# Patient Record
Sex: FEMALE | Race: BLACK OR AFRICAN AMERICAN | NOT HISPANIC OR LATINO | URBAN - METROPOLITAN AREA
[De-identification: names, ages, dates, MRNs, and addresses within clinical notes are randomized per-mention and may not be internally consistent; named-entity substitution may affect disease eponyms.]

---

## 2018-10-02 ENCOUNTER — EMERGENCY (EMERGENCY)
Facility: HOSPITAL | Age: 48
LOS: 1 days | Discharge: HOME | End: 2018-10-02
Admitting: EMERGENCY MEDICINE
Payer: COMMERCIAL

## 2018-10-02 ENCOUNTER — RESULT REVIEW (OUTPATIENT)
Age: 48
End: 2018-10-02

## 2018-10-02 VITALS
TEMPERATURE: 98 F | SYSTOLIC BLOOD PRESSURE: 111 MMHG | HEART RATE: 64 BPM | DIASTOLIC BLOOD PRESSURE: 63 MMHG | OXYGEN SATURATION: 100 % | RESPIRATION RATE: 18 BRPM

## 2018-10-02 PROBLEM — Z00.00 ENCOUNTER FOR PREVENTIVE HEALTH EXAMINATION: Status: ACTIVE | Noted: 2018-10-02

## 2018-10-02 PROCEDURE — 99284 EMERGENCY DEPT VISIT MOD MDM: CPT

## 2018-10-02 NOTE — ED ADULT TRIAGE NOTE - CHIEF COMPLAINT QUOTE
pt. states she went to the OBGYN earlier today, the OB attempted to remove her IUD but only a part of it was successfully retrieved, pt. was advised that she will need sx to remove the remainder.  Reports abd cramping and vaginal bleeding upon returning home, used 1 sanitary pad since about 6pm.  PMHx lupus.

## 2018-10-02 NOTE — ED PROVIDER NOTE - PROGRESS NOTE DETAILS
HERMAN Dunham: Spoke with GYN, as long as IUD has not migrated out of the uterus she can schedule outpt follow up, will touch base with them with results of sono. HERMAN Dunham: Pt signed out to HERMAN León pending US and lab results, discussion with GYN in regards to US results. HERMAN RMAIREZ:  Sono shows Poorly visualized echogenic focus within the lower uterine segment,   likely representing the retained IUD.  Results discussed with OB/GYN who recommends discharge and outpatient management.

## 2018-10-02 NOTE — ED PROVIDER NOTE - PLAN OF CARE
Advance activity as tolerated.  Continue all previously prescribed medications as directed unless otherwise instructed.  Take Tylenol 650mg (Two 325 mg pills) every 4-6 hours as needed for pain or fevers. Follow up with your OB/GYN in 48-72 hours- bring copies of your results.  Return to the ER for worsening or persistent symptoms, including but not limited to worsening/persistent pain and/or ANY NEW OR CONCERNING SYMPTOMS. If you have issues obtaining follow up, please call: [phone] to obtain a doctor or specialist who takes your insurance in your area.  You may call [phone] to make an appointment with the internal medicine clinic.

## 2018-10-02 NOTE — ED PROVIDER NOTE - MEDICAL DECISION MAKING DETAILS
48yF w/pmhx lupus presenting with vaginal bleeding and pelvic cramping after having part of her IUD removed today. Will get TVUS to locate the remainder of the IUD, cbc/cmp, ucg, GYN consult.

## 2018-10-02 NOTE — ED PROVIDER NOTE - CARE PLAN
Principal Discharge DX:	IUD complication  Assessment and plan of treatment:	Advance activity as tolerated.  Continue all previously prescribed medications as directed unless otherwise instructed.  Take Tylenol 650mg (Two 325 mg pills) every 4-6 hours as needed for pain or fevers. Follow up with your OB/GYN in 48-72 hours- bring copies of your results.  Return to the ER for worsening or persistent symptoms, including but not limited to worsening/persistent pain and/or ANY NEW OR CONCERNING SYMPTOMS. If you have issues obtaining follow up, please call: [phone] to obtain a doctor or specialist who takes your insurance in your area.  You may call [phone] to make an appointment with the internal medicine clinic.

## 2018-10-02 NOTE — ED PROVIDER NOTE - OBJECTIVE STATEMENT
48yF w/pmhx lupus presenting with pelvic cramping and vaginal bleeding after having part of her IUD removed today. Pt states she had a 5 year IUD in place for 8 years and went to have it removed today. She was told that only part of the IUD was removed and that she would need surgery to remove to the rest. Pt states she was told to get a pelvic US but it was closed when she got there. Upon returning home she developed pelvic cramping and vaginal bleeding, on her second pad. Pt denies fever/chills, nausea, vomiting, diarrhea, lightheadedness, dizziness, chest pain, sob or any other concerns.  Pts GYN is affiliated Dr. Sandy Cheema

## 2018-10-02 NOTE — ED PROVIDER NOTE - PHYSICAL EXAMINATION
Pelvic: no active bleeding, unable to visualize IUD strings, no cervical tenderness or adnexal tenderness

## 2018-10-03 LAB
ALBUMIN SERPL ELPH-MCNC: 3.8 G/DL — SIGNIFICANT CHANGE UP (ref 3.3–5)
ALP SERPL-CCNC: 67 U/L — SIGNIFICANT CHANGE UP (ref 40–120)
ALT FLD-CCNC: 18 U/L — SIGNIFICANT CHANGE UP (ref 4–33)
AST SERPL-CCNC: 36 U/L — HIGH (ref 4–32)
BASOPHILS # BLD AUTO: 0.02 K/UL — SIGNIFICANT CHANGE UP (ref 0–0.2)
BASOPHILS NFR BLD AUTO: 0.4 % — SIGNIFICANT CHANGE UP (ref 0–2)
BILIRUB SERPL-MCNC: < 0.2 MG/DL — LOW (ref 0.2–1.2)
BUN SERPL-MCNC: 13 MG/DL — SIGNIFICANT CHANGE UP (ref 7–23)
CALCIUM SERPL-MCNC: 8.4 MG/DL — SIGNIFICANT CHANGE UP (ref 8.4–10.5)
CHLORIDE SERPL-SCNC: 103 MMOL/L — SIGNIFICANT CHANGE UP (ref 98–107)
CO2 SERPL-SCNC: 24 MMOL/L — SIGNIFICANT CHANGE UP (ref 22–31)
CREAT SERPL-MCNC: 0.79 MG/DL — SIGNIFICANT CHANGE UP (ref 0.5–1.3)
EOSINOPHIL # BLD AUTO: 0.26 K/UL — SIGNIFICANT CHANGE UP (ref 0–0.5)
EOSINOPHIL NFR BLD AUTO: 4.7 % — SIGNIFICANT CHANGE UP (ref 0–6)
GLUCOSE SERPL-MCNC: 83 MG/DL — SIGNIFICANT CHANGE UP (ref 70–99)
HCT VFR BLD CALC: 32.7 % — LOW (ref 34.5–45)
HGB BLD-MCNC: 10.3 G/DL — LOW (ref 11.5–15.5)
IMM GRANULOCYTES # BLD AUTO: 0.01 # — SIGNIFICANT CHANGE UP
IMM GRANULOCYTES NFR BLD AUTO: 0.2 % — SIGNIFICANT CHANGE UP (ref 0–1.5)
LYMPHOCYTES # BLD AUTO: 2.53 K/UL — SIGNIFICANT CHANGE UP (ref 1–3.3)
LYMPHOCYTES # BLD AUTO: 45.4 % — HIGH (ref 13–44)
MCHC RBC-ENTMCNC: 27.2 PG — SIGNIFICANT CHANGE UP (ref 27–34)
MCHC RBC-ENTMCNC: 31.5 % — LOW (ref 32–36)
MCV RBC AUTO: 86.3 FL — SIGNIFICANT CHANGE UP (ref 80–100)
MONOCYTES # BLD AUTO: 0.63 K/UL — SIGNIFICANT CHANGE UP (ref 0–0.9)
MONOCYTES NFR BLD AUTO: 11.3 % — SIGNIFICANT CHANGE UP (ref 2–14)
NEUTROPHILS # BLD AUTO: 2.12 K/UL — SIGNIFICANT CHANGE UP (ref 1.8–7.4)
NEUTROPHILS NFR BLD AUTO: 38 % — LOW (ref 43–77)
NRBC # FLD: 0 — SIGNIFICANT CHANGE UP
PLATELET # BLD AUTO: 225 K/UL — SIGNIFICANT CHANGE UP (ref 150–400)
PMV BLD: 12.4 FL — SIGNIFICANT CHANGE UP (ref 7–13)
POTASSIUM SERPL-MCNC: 4.7 MMOL/L — SIGNIFICANT CHANGE UP (ref 3.5–5.3)
POTASSIUM SERPL-SCNC: 4.7 MMOL/L — SIGNIFICANT CHANGE UP (ref 3.5–5.3)
PROT SERPL-MCNC: 7.5 G/DL — SIGNIFICANT CHANGE UP (ref 6–8.3)
RBC # BLD: 3.79 M/UL — LOW (ref 3.8–5.2)
RBC # FLD: 12.4 % — SIGNIFICANT CHANGE UP (ref 10.3–14.5)
SODIUM SERPL-SCNC: 136 MMOL/L — SIGNIFICANT CHANGE UP (ref 135–145)
WBC # BLD: 5.57 K/UL — SIGNIFICANT CHANGE UP (ref 3.8–10.5)
WBC # FLD AUTO: 5.57 K/UL — SIGNIFICANT CHANGE UP (ref 3.8–10.5)

## 2018-10-03 PROCEDURE — 76830 TRANSVAGINAL US NON-OB: CPT | Mod: 26

## 2018-10-05 PROBLEM — L93.0 DISCOID LUPUS ERYTHEMATOSUS: Chronic | Status: ACTIVE | Noted: 2018-10-02

## 2018-10-06 ENCOUNTER — OUTPATIENT (OUTPATIENT)
Dept: OUTPATIENT SERVICES | Facility: HOSPITAL | Age: 48
LOS: 1 days | End: 2018-10-06
Payer: COMMERCIAL

## 2018-10-06 VITALS
HEART RATE: 60 BPM | TEMPERATURE: 98 F | RESPIRATION RATE: 16 BRPM | DIASTOLIC BLOOD PRESSURE: 70 MMHG | HEIGHT: 62 IN | OXYGEN SATURATION: 96 % | WEIGHT: 177.91 LBS | SYSTOLIC BLOOD PRESSURE: 120 MMHG

## 2018-10-06 DIAGNOSIS — L93.0 DISCOID LUPUS ERYTHEMATOSUS: ICD-10-CM

## 2018-10-06 DIAGNOSIS — T83.9XXA UNSPECIFIED COMPLICATION OF GENITOURINARY PROSTHETIC DEVICE, IMPLANT AND GRAFT, INITIAL ENCOUNTER: ICD-10-CM

## 2018-10-06 DIAGNOSIS — T83.39XA OTHER MECHANICAL COMPLICATION OF INTRAUTERINE CONTRACEPTIVE DEVICE, INITIAL ENCOUNTER: ICD-10-CM

## 2018-10-06 DIAGNOSIS — N60.01 SOLITARY CYST OF RIGHT BREAST: Chronic | ICD-10-CM

## 2018-10-06 DIAGNOSIS — Z98.890 OTHER SPECIFIED POSTPROCEDURAL STATES: Chronic | ICD-10-CM

## 2018-10-06 LAB
BASOPHILS # BLD AUTO: 0.01 K/UL — SIGNIFICANT CHANGE UP (ref 0–0.2)
BASOPHILS NFR BLD AUTO: 0.2 % — SIGNIFICANT CHANGE UP (ref 0–2)
BUN SERPL-MCNC: 17 MG/DL — SIGNIFICANT CHANGE UP (ref 7–23)
CALCIUM SERPL-MCNC: 9.3 MG/DL — SIGNIFICANT CHANGE UP (ref 8.4–10.5)
CHLORIDE SERPL-SCNC: 103 MMOL/L — SIGNIFICANT CHANGE UP (ref 98–107)
CO2 SERPL-SCNC: 25 MMOL/L — SIGNIFICANT CHANGE UP (ref 22–31)
CREAT SERPL-MCNC: 0.87 MG/DL — SIGNIFICANT CHANGE UP (ref 0.5–1.3)
EOSINOPHIL # BLD AUTO: 0.12 K/UL — SIGNIFICANT CHANGE UP (ref 0–0.5)
EOSINOPHIL NFR BLD AUTO: 2.1 % — SIGNIFICANT CHANGE UP (ref 0–6)
GLUCOSE SERPL-MCNC: 73 MG/DL — SIGNIFICANT CHANGE UP (ref 70–99)
HCT VFR BLD CALC: 36.1 % — SIGNIFICANT CHANGE UP (ref 34.5–45)
HGB BLD-MCNC: 11.7 G/DL — SIGNIFICANT CHANGE UP (ref 11.5–15.5)
IMM GRANULOCYTES # BLD AUTO: 0.01 # — SIGNIFICANT CHANGE UP
IMM GRANULOCYTES NFR BLD AUTO: 0.2 % — SIGNIFICANT CHANGE UP (ref 0–1.5)
LYMPHOCYTES # BLD AUTO: 1.89 K/UL — SIGNIFICANT CHANGE UP (ref 1–3.3)
LYMPHOCYTES # BLD AUTO: 32.5 % — SIGNIFICANT CHANGE UP (ref 13–44)
MCHC RBC-ENTMCNC: 27.7 PG — SIGNIFICANT CHANGE UP (ref 27–34)
MCHC RBC-ENTMCNC: 32.4 % — SIGNIFICANT CHANGE UP (ref 32–36)
MCV RBC AUTO: 85.5 FL — SIGNIFICANT CHANGE UP (ref 80–100)
MONOCYTES # BLD AUTO: 0.44 K/UL — SIGNIFICANT CHANGE UP (ref 0–0.9)
MONOCYTES NFR BLD AUTO: 7.6 % — SIGNIFICANT CHANGE UP (ref 2–14)
NEUTROPHILS # BLD AUTO: 3.34 K/UL — SIGNIFICANT CHANGE UP (ref 1.8–7.4)
NEUTROPHILS NFR BLD AUTO: 57.4 % — SIGNIFICANT CHANGE UP (ref 43–77)
NRBC # FLD: 0 — SIGNIFICANT CHANGE UP
PLATELET # BLD AUTO: 259 K/UL — SIGNIFICANT CHANGE UP (ref 150–400)
PMV BLD: 11.7 FL — SIGNIFICANT CHANGE UP (ref 7–13)
POTASSIUM SERPL-MCNC: 4.4 MMOL/L — SIGNIFICANT CHANGE UP (ref 3.5–5.3)
POTASSIUM SERPL-SCNC: 4.4 MMOL/L — SIGNIFICANT CHANGE UP (ref 3.5–5.3)
RBC # BLD: 4.22 M/UL — SIGNIFICANT CHANGE UP (ref 3.8–5.2)
RBC # FLD: 12.7 % — SIGNIFICANT CHANGE UP (ref 10.3–14.5)
SODIUM SERPL-SCNC: 140 MMOL/L — SIGNIFICANT CHANGE UP (ref 135–145)
WBC # BLD: 5.81 K/UL — SIGNIFICANT CHANGE UP (ref 3.8–10.5)
WBC # FLD AUTO: 5.81 K/UL — SIGNIFICANT CHANGE UP (ref 3.8–10.5)

## 2018-10-06 PROCEDURE — 93010 ELECTROCARDIOGRAM REPORT: CPT

## 2018-10-06 NOTE — H&P PST ADULT - NEGATIVE NEUROLOGICAL SYMPTOMS
no transient paralysis/no paresthesias/no tremors/no difficulty walking/no vertigo/no loss of sensation/no loss of consciousness/no headache/no weakness

## 2018-10-06 NOTE — H&P PST ADULT - FAMILY HISTORY
Mother  Still living? Yes, Estimated age: Age Unknown  Family history of diabetes mellitus (DM), Age at diagnosis: 71-80  Family history of breast cancer in mother, Age at diagnosis: Age Unknown

## 2018-10-06 NOTE — H&P PST ADULT - PSH
Breast cyst, right  Excision of cyst - benign - 2008   Deliv (ICD9 669.71)  x 2 ( & )  H/O cardiac radiofrequency ablation  2007

## 2018-10-06 NOTE — H&P PST ADULT - PRO PAIN LIFE ADAPT
inability to concentrate/decreased activity level/inability to enjoy life/inability or reluctance to perform ADLs/inability to work

## 2018-10-06 NOTE — H&P PST ADULT - PROBLEM SELECTOR PLAN 1
Scheduled for Intrauterine Device Removal, Operative Hysteroscopy on 10/18/18  Pending labs; Preop instructions with Famotidine given & explained, pt verbalized understanding

## 2018-10-06 NOTE — H&P PST ADULT - NEGATIVE CARDIOVASCULAR SYMPTOMS
no dyspnea on exertion/no chest pain/no claudication/no peripheral edema/no palpitations/no orthopnea/no paroxysmal nocturnal dyspnea

## 2018-10-06 NOTE — H&P PST ADULT - HISTORY OF PRESENT ILLNESS
47 y/o female with Hx Lupus, RA, presents for preop evaluation for Intrauterine Device Removal, Operative Hysteroscopy on 10/18/18. Pt had IUD inserted since 2010 & recently started c/o of vaginal spotting. Pt was referred to Dr Larson for further evaluation; where She attempted to remove IUD and The device broke. Since then pt has been c/o of bleed 47 y/o female with Hx Lupus, RA, presents for preop evaluation for Intrauterine Device Removal, Operative Hysteroscopy on 10/18/18. Pt had IUD inserted since 2010 & recently started c/o of vaginal spotting. Pt was referred to Dr Larson for further evaluation; where She attempted to remove IUD and the device broke. Since then pt has been c/o of bleeding & abdominal /pelvic cramping. 49 y/o female with Hx SVT's (2007), s/p Ablation, Lupus, RA, presents for preop evaluation for Intrauterine Device Removal, Operative Hysteroscopy on 10/18/18. Pt had IUD inserted since 2010 & recently started c/o of vaginal spotting. Pt was referred to Dr Larson for further evaluation; where She attempted to remove IUD and the device broke. Since then pt has been c/o of bleeding & abdominal /pelvic cramping.

## 2018-10-06 NOTE — H&P PST ADULT - NEGATIVE ENMT SYMPTOMS
no ear pain/no nasal congestion/no nasal obstruction/no hearing difficulty/no post-nasal discharge/no nose bleeds/no tinnitus/no vertigo/no sinus symptoms

## 2018-10-06 NOTE — H&P PST ADULT - RS GEN PE MLT RESP DETAILS PC
respirations non-labored/clear to auscultation bilaterally/breath sounds equal/no wheezes/no rales/no rhonchi/airway patent

## 2018-10-06 NOTE — H&P PST ADULT - NEGATIVE GENERAL GENITOURINARY SYMPTOMS
no urinary hesitancy/no dysuria/normal urinary frequency/no incontinence/no hematuria/no flank pain L/no flank pain R

## 2018-10-09 PROBLEM — M06.9 RHEUMATOID ARTHRITIS, UNSPECIFIED: Chronic | Status: ACTIVE | Noted: 2018-10-06

## 2018-10-09 PROBLEM — I47.1 SUPRAVENTRICULAR TACHYCARDIA: Chronic | Status: ACTIVE | Noted: 2018-10-06

## 2018-10-11 ENCOUNTER — TRANSCRIPTION ENCOUNTER (OUTPATIENT)
Age: 48
End: 2018-10-11

## 2018-10-12 ENCOUNTER — RESULT REVIEW (OUTPATIENT)
Age: 48
End: 2018-10-12

## 2018-10-12 ENCOUNTER — OUTPATIENT (OUTPATIENT)
Dept: OUTPATIENT SERVICES | Facility: HOSPITAL | Age: 48
LOS: 1 days | End: 2018-10-12
Payer: COMMERCIAL

## 2018-10-12 VITALS
HEIGHT: 62 IN | HEART RATE: 89 BPM | OXYGEN SATURATION: 100 % | RESPIRATION RATE: 18 BRPM | DIASTOLIC BLOOD PRESSURE: 36 MMHG | WEIGHT: 177.91 LBS | SYSTOLIC BLOOD PRESSURE: 94 MMHG | TEMPERATURE: 98 F

## 2018-10-12 VITALS
OXYGEN SATURATION: 99 % | HEART RATE: 69 BPM | TEMPERATURE: 98 F | RESPIRATION RATE: 18 BRPM | DIASTOLIC BLOOD PRESSURE: 68 MMHG | SYSTOLIC BLOOD PRESSURE: 115 MMHG

## 2018-10-12 DIAGNOSIS — Z98.890 OTHER SPECIFIED POSTPROCEDURAL STATES: Chronic | ICD-10-CM

## 2018-10-12 DIAGNOSIS — N60.01 SOLITARY CYST OF RIGHT BREAST: Chronic | ICD-10-CM

## 2018-10-12 DIAGNOSIS — T83.31XA BREAKDOWN (MECHANICAL) OF INTRAUTERINE CONTRACEPTIVE DEVICE, INITIAL ENCOUNTER: ICD-10-CM

## 2018-10-12 DIAGNOSIS — Z53.8 PROCEDURE AND TREATMENT NOT CARRIED OUT FOR OTHER REASONS: ICD-10-CM

## 2018-10-12 LAB — HCG UR QL: NEGATIVE — SIGNIFICANT CHANGE UP

## 2018-10-12 PROCEDURE — 88300 SURGICAL PATH GROSS: CPT

## 2018-10-12 PROCEDURE — 88300 SURGICAL PATH GROSS: CPT | Mod: 26

## 2018-10-12 PROCEDURE — 58562 HYSTEROSCOPY REMOVE FB: CPT

## 2018-10-12 PROCEDURE — 81025 URINE PREGNANCY TEST: CPT

## 2018-10-12 RX ORDER — IBUPROFEN 200 MG
600 TABLET ORAL EVERY 6 HOURS
Qty: 0 | Refills: 0 | Status: DISCONTINUED | OUTPATIENT
Start: 2018-10-12 | End: 2018-10-20

## 2018-10-12 RX ORDER — HYDROMORPHONE HYDROCHLORIDE 2 MG/ML
0.5 INJECTION INTRAMUSCULAR; INTRAVENOUS; SUBCUTANEOUS
Qty: 0 | Refills: 0 | Status: DISCONTINUED | OUTPATIENT
Start: 2018-10-12 | End: 2018-10-15

## 2018-10-12 RX ORDER — HYDROXYCHLOROQUINE SULFATE 200 MG
1 TABLET ORAL
Qty: 0 | Refills: 0 | COMMUNITY

## 2018-10-12 RX ORDER — IBUPROFEN 200 MG
1 TABLET ORAL
Qty: 40 | Refills: 2 | OUTPATIENT
Start: 2018-10-12 | End: 2018-11-10

## 2018-10-12 RX ORDER — SODIUM CHLORIDE 9 MG/ML
1000 INJECTION, SOLUTION INTRAVENOUS
Qty: 0 | Refills: 0 | Status: DISCONTINUED | OUTPATIENT
Start: 2018-10-12 | End: 2018-10-15

## 2018-10-12 RX ORDER — ONDANSETRON 8 MG/1
4 TABLET, FILM COATED ORAL ONCE
Qty: 0 | Refills: 0 | Status: DISCONTINUED | OUTPATIENT
Start: 2018-10-12 | End: 2018-10-20

## 2018-10-12 RX ORDER — OMEGA-3 ACID ETHYL ESTERS 1 G
1 CAPSULE ORAL
Qty: 0 | Refills: 0 | COMMUNITY

## 2018-10-12 RX ADMIN — Medication 110 MILLIGRAM(S): at 19:15

## 2018-10-12 NOTE — ASU DISCHARGE PLAN (ADULT/PEDIATRIC). - LAUNCH MEDICATION RECONCILIATION
Face to face report given with opportunity to observe patient.    Report given to Jailene Dotson   9/7/2017  4:36 PM       <<-----Click here for Discharge Medication Review

## 2018-10-12 NOTE — ASU DISCHARGE PLAN (ADULT/PEDIATRIC). - ACTIVITY LEVEL
nothing per vagina/no douching/no tampons/no heavy lifting/nothing per rectum/no tub baths/no intercourse/weight bearing as tolerated

## 2018-10-12 NOTE — ASU DISCHARGE PLAN (ADULT/PEDIATRIC). - NOTIFY
Pain not relieved by Medications/Increased Irritability or Sluggishness/Inability to Tolerate Liquids or Foods/GYN Fever>100.4/Bleeding that does not stop/Unable to Urinate

## 2018-10-13 NOTE — BRIEF OPERATIVE NOTE - POST-OP DX
Abnormal uterine bleeding (AUB)  10/13/2018    Sandy Garcia  Submucous leiomyoma of uterus  10/13/2018    Sandy Garcia Mechanical breakdown of intrauterine contraceptive device, subsequent encounter  10/13/2018    Active  Sandy Larson

## 2018-10-13 NOTE — BRIEF OPERATIVE NOTE - OPERATION/FINDINGS
approximately 2x2 cm submucosal leiomyoma on the right lateral wall of the uterus part of the horizontal bar of the Paragard IUD with copper wire noted at the junction of the uterus and cervix, and removed successfully, Normal uterine cavity.

## 2018-10-13 NOTE — BRIEF OPERATIVE NOTE - SPECIMENS
Leiomyoma and endometrial curettings IUD part (the rest of the IUD was removed in the office prior to surgery)

## 2018-10-13 NOTE — BRIEF OPERATIVE NOTE - PROCEDURE
<<-----Click on this checkbox to enter Procedure Dilatation and curettage  10/13/2018    Active  LRAYEV  Hysteroscopic removal of leiomyomata  10/13/2018    Active  LRAYEV Diagnostic hysteroscopy for lost intrauterine device (IUD)  10/13/2018    Active  LRAYEV

## 2018-10-13 NOTE — BRIEF OPERATIVE NOTE - PRE-OP DX
Abnormal uterine bleeding (AUB)  10/13/2018    Sandy Garcia  Uterine polyp  10/13/2018    Sandy Garcia Mechanical breakdown of intrauterine contraceptive device, subsequent encounter  10/13/2018    Active  Sandy Larson

## 2018-11-12 NOTE — ED ADULT TRIAGE NOTE - AS O2 DELIVERY
Urine sample collected and sent to lab. Gave pt percocet order and pain 10/10 and she's moaning. CaLLED ME BACK TO ROOM AND WHEN I SPOKE TO HER THERE WAS NO RESPONSE. I SPOKE LOUDER AND SHE STARTED MOANING AND REQUESTED SOMETHING ELSE FOR HER ARM. tHEN [address] IT WS TO LATE TO DO ANOTHER ONE.  ALSO, EXPLAINED THE URINE NEEDS TO RESULT AND WHEN IT DOES i'LL CALL md IF PREGNANCE IS NEGATIVE.   Fatemeh Concepcion RN 
 
 room air

## 2019-05-05 ENCOUNTER — EMERGENCY (EMERGENCY)
Facility: HOSPITAL | Age: 49
LOS: 1 days | Discharge: HOME | End: 2019-05-05
Attending: EMERGENCY MEDICINE | Admitting: EMERGENCY MEDICINE
Payer: MEDICAID

## 2019-05-05 VITALS
HEART RATE: 77 BPM | TEMPERATURE: 99 F | RESPIRATION RATE: 20 BRPM | DIASTOLIC BLOOD PRESSURE: 66 MMHG | SYSTOLIC BLOOD PRESSURE: 100 MMHG

## 2019-05-05 DIAGNOSIS — Z98.890 OTHER SPECIFIED POSTPROCEDURAL STATES: Chronic | ICD-10-CM

## 2019-05-05 DIAGNOSIS — N60.01 SOLITARY CYST OF RIGHT BREAST: Chronic | ICD-10-CM

## 2019-05-05 LAB
ALBUMIN SERPL ELPH-MCNC: 4.2 G/DL — SIGNIFICANT CHANGE UP (ref 3.3–5)
ALP SERPL-CCNC: 69 U/L — SIGNIFICANT CHANGE UP (ref 40–120)
ALT FLD-CCNC: 22 U/L — SIGNIFICANT CHANGE UP (ref 4–33)
ANION GAP SERPL CALC-SCNC: 12 MMO/L — SIGNIFICANT CHANGE UP (ref 7–14)
AST SERPL-CCNC: 27 U/L — SIGNIFICANT CHANGE UP (ref 4–32)
BASOPHILS # BLD AUTO: 0.01 K/UL — SIGNIFICANT CHANGE UP (ref 0–0.2)
BASOPHILS NFR BLD AUTO: 0.2 % — SIGNIFICANT CHANGE UP (ref 0–2)
BILIRUB SERPL-MCNC: 0.3 MG/DL — SIGNIFICANT CHANGE UP (ref 0.2–1.2)
BUN SERPL-MCNC: 13 MG/DL — SIGNIFICANT CHANGE UP (ref 7–23)
CALCIUM SERPL-MCNC: 9 MG/DL — SIGNIFICANT CHANGE UP (ref 8.4–10.5)
CHLORIDE SERPL-SCNC: 111 MMOL/L — HIGH (ref 98–107)
CO2 SERPL-SCNC: 22 MMOL/L — SIGNIFICANT CHANGE UP (ref 22–31)
CREAT SERPL-MCNC: 0.81 MG/DL — SIGNIFICANT CHANGE UP (ref 0.5–1.3)
EOSINOPHIL # BLD AUTO: 0.03 K/UL — SIGNIFICANT CHANGE UP (ref 0–0.5)
EOSINOPHIL NFR BLD AUTO: 0.5 % — SIGNIFICANT CHANGE UP (ref 0–6)
GLUCOSE SERPL-MCNC: 92 MG/DL — SIGNIFICANT CHANGE UP (ref 70–99)
HCT VFR BLD CALC: 36.2 % — SIGNIFICANT CHANGE UP (ref 34.5–45)
HGB BLD-MCNC: 11.4 G/DL — LOW (ref 11.5–15.5)
IMM GRANULOCYTES NFR BLD AUTO: 0.3 % — SIGNIFICANT CHANGE UP (ref 0–1.5)
LYMPHOCYTES # BLD AUTO: 0.98 K/UL — LOW (ref 1–3.3)
LYMPHOCYTES # BLD AUTO: 17 % — SIGNIFICANT CHANGE UP (ref 13–44)
MCHC RBC-ENTMCNC: 26.9 PG — LOW (ref 27–34)
MCHC RBC-ENTMCNC: 31.5 % — LOW (ref 32–36)
MCV RBC AUTO: 85.4 FL — SIGNIFICANT CHANGE UP (ref 80–100)
MONOCYTES # BLD AUTO: 0.29 K/UL — SIGNIFICANT CHANGE UP (ref 0–0.9)
MONOCYTES NFR BLD AUTO: 5 % — SIGNIFICANT CHANGE UP (ref 2–14)
NEUTROPHILS # BLD AUTO: 4.43 K/UL — SIGNIFICANT CHANGE UP (ref 1.8–7.4)
NEUTROPHILS NFR BLD AUTO: 77 % — SIGNIFICANT CHANGE UP (ref 43–77)
NRBC # FLD: 0 K/UL — SIGNIFICANT CHANGE UP (ref 0–0)
PLATELET # BLD AUTO: 288 K/UL — SIGNIFICANT CHANGE UP (ref 150–400)
PMV BLD: 10.7 FL — SIGNIFICANT CHANGE UP (ref 7–13)
POTASSIUM SERPL-MCNC: 3.6 MMOL/L — SIGNIFICANT CHANGE UP (ref 3.5–5.3)
POTASSIUM SERPL-SCNC: 3.6 MMOL/L — SIGNIFICANT CHANGE UP (ref 3.5–5.3)
PROT SERPL-MCNC: 7.7 G/DL — SIGNIFICANT CHANGE UP (ref 6–8.3)
RBC # BLD: 4.24 M/UL — SIGNIFICANT CHANGE UP (ref 3.8–5.2)
RBC # FLD: 12.2 % — SIGNIFICANT CHANGE UP (ref 10.3–14.5)
SODIUM SERPL-SCNC: 145 MMOL/L — SIGNIFICANT CHANGE UP (ref 135–145)
WBC # BLD: 5.76 K/UL — SIGNIFICANT CHANGE UP (ref 3.8–10.5)
WBC # FLD AUTO: 5.76 K/UL — SIGNIFICANT CHANGE UP (ref 3.8–10.5)

## 2019-05-05 PROCEDURE — 99284 EMERGENCY DEPT VISIT MOD MDM: CPT

## 2019-05-05 RX ORDER — DIPHENHYDRAMINE HCL 50 MG
50 CAPSULE ORAL ONCE
Qty: 0 | Refills: 0 | Status: COMPLETED | OUTPATIENT
Start: 2019-05-05 | End: 2019-05-05

## 2019-05-05 RX ORDER — FAMOTIDINE 10 MG/ML
20 INJECTION INTRAVENOUS ONCE
Qty: 0 | Refills: 0 | Status: DISCONTINUED | OUTPATIENT
Start: 2019-05-05 | End: 2019-05-05

## 2019-05-05 RX ORDER — FAMOTIDINE 10 MG/ML
20 INJECTION INTRAVENOUS ONCE
Qty: 0 | Refills: 0 | Status: COMPLETED | OUTPATIENT
Start: 2019-05-05 | End: 2019-05-05

## 2019-05-05 RX ADMIN — FAMOTIDINE 20 MILLIGRAM(S): 10 INJECTION INTRAVENOUS at 23:18

## 2019-05-05 RX ADMIN — Medication 50 MILLIGRAM(S): at 23:18

## 2019-05-05 RX ADMIN — Medication 125 MILLIGRAM(S): at 23:18

## 2019-05-05 NOTE — ED ADULT NURSE NOTE - OBJECTIVE STATEMENT
48 y female A+OX3 presents to the ER with the complaint of facial swelling. Pt has hx of lupus and states today around 4:30pm she noticed swelling of the bilateral lower face along with joint pain and throat pain. Pt states it feels like her usual lupus flare up but the swelling of the face as per patient has not been to this degree. Pt states her tongue feels heavy too. Denies any allergies to foods or meds. Denies eating or drinking anything out of the usual today. Pt able to speak in full sentences but states she does have to spit  more often than usual since swelling. Pt sating 98% on room air. 20 g iv placed on left ac, labs drawn and sent. Will continue to monitor.

## 2019-05-05 NOTE — ED ADULT TRIAGE NOTE - CHIEF COMPLAINT QUOTE
Pt is having vomiting, facial swelling and throat tightness since three hours ago. Pt says she vomited several times. Has PMH of Lupus. c/o pain in throat and slight difficulty breathing. Both sides of face appears to be swollen. Pt says she feels like her tongue is swelling up also.

## 2019-05-06 VITALS
HEART RATE: 69 BPM | SYSTOLIC BLOOD PRESSURE: 99 MMHG | OXYGEN SATURATION: 100 % | TEMPERATURE: 99 F | DIASTOLIC BLOOD PRESSURE: 51 MMHG | RESPIRATION RATE: 18 BRPM

## 2019-05-06 LAB
CRP SERPL-MCNC: < 4 MG/L — SIGNIFICANT CHANGE UP
ERYTHROCYTE [SEDIMENTATION RATE] IN BLOOD: 7 MM/HR — SIGNIFICANT CHANGE UP (ref 4–25)

## 2019-05-06 RX ORDER — EPINEPHRINE 0.3 MG/.3ML
0.3 INJECTION INTRAMUSCULAR; SUBCUTANEOUS
Qty: 1 | Refills: 0 | OUTPATIENT
Start: 2019-05-06

## 2019-05-06 RX ORDER — DIPHENHYDRAMINE HCL 50 MG
1 CAPSULE ORAL
Qty: 15 | Refills: 0 | OUTPATIENT
Start: 2019-05-06 | End: 2019-05-10

## 2019-05-06 RX ORDER — FAMOTIDINE 10 MG/ML
1 INJECTION INTRAVENOUS
Qty: 8 | Refills: 0 | OUTPATIENT
Start: 2019-05-06 | End: 2019-05-09

## 2019-05-06 NOTE — ED PROVIDER NOTE - OBJECTIVE STATEMENT
48F h/o SLE on plaquenil, prior angioedema, c/o joint aches reminiscent of but worse than prior flare ups of SLE for few days, She too 2 old prednisone pills for last 2 days (does not know strength), also c/o nausea and vomiting followed by throat pain and tongue discomfort and swelling of lower face. No SOB or throat closing. No rash. no wheezing No fever. Review of Symptoms in all systems otherwise negative, except as indicated

## 2019-05-06 NOTE — ED PROVIDER NOTE - NSFOLLOWUPINSTRUCTIONS_ED_ALL_ED_FT
Please follow up with your rheumatologist and an allergist. Return to ED if any shortness of breath, unable to swallow saliva, increased swelling or other concerning symptoms. PLease take benadryl as needed for allergic symptoms. Please take prednisone daily. Please fill your prescription for an epipen.

## 2019-05-06 NOTE — ED PROVIDER NOTE - PROGRESS NOTE DETAILS
Torey Mujica MD PGY4: edema improved, will rx with allergic cocktail and return precautions, less likely lupus flare in setting of nml ESR/CRP

## 2019-05-06 NOTE — ED PROVIDER NOTE - PHYSICAL EXAMINATION
+ mild swelling of lower face bilat cheeks extending to upper neck and lips  Tongue normal appearance,   Voice normal No stridor  No wheezing  No skin rashes

## 2019-05-06 NOTE — ED PROVIDER NOTE - MUSCULOSKELETAL, MLM
Spine appears normal, range of motion is not limited, diffuse mild joint tenderness without swelling or redness

## 2019-05-06 NOTE — ED PROVIDER NOTE - CLINICAL SUMMARY MEDICAL DECISION MAKING FREE TEXT BOX
Lupus flare with possible allergic reaction/angioedema. Also N/V mild abdo pains. Plan: IV steroids, benadryl, pepcid, zofran, fluids, labs, serial exams

## 2019-10-01 ENCOUNTER — TRANSCRIPTION ENCOUNTER (OUTPATIENT)
Age: 49
End: 2019-10-01

## 2020-11-23 NOTE — ED ADULT TRIAGE NOTE - TEMPERATURE IN FAHRENHEIT (DEGREES F)
97.9 Rhomboid Transposition Flap Text: The defect edges were debeveled with a #15 scalpel blade.  Given the location of the defect and the proximity to free margins a rhomboid transposition flap was deemed most appropriate.  Using a sterile surgical marker, an appropriate rhomboid flap was drawn incorporating the defect.    The area thus outlined was incised deep to adipose tissue with a #15 scalpel blade.  The skin margins were undermined to an appropriate distance in all directions utilizing iris scissors.

## 2022-04-19 NOTE — H&P PST ADULT - CARDIOVASCULAR DETAILS
Kenalog Preparation: Kenalog Include Z78.9 (Other Specified Conditions Influencing Health Status) As An Associated Diagnosis?: No Medical Necessity Clause: This procedure was medically necessary because the lesions that were treated were: Detail Level: Detailed Treatment Number (Optional): 1 Validate Note Data When Using Inventory: Yes X Size Of Lesion In Cm (Optional): 0 Administered By (Optional): Madyson Mishra PA-C Total Volume Injected (Ccs- Only Use Numbers And Decimals): 2.0 Concentration Of Solution Injected (Mg/Ml): 10.0 Consent: The risks of atrophy were reviewed with the patient. positive S2/positive S1

## 2022-11-30 NOTE — H&P PST ADULT - PMH
I have sent in a PA for Lyrica per her insurance company because the one they received did not have the ICD code. I will contact the patient when I get an answer.    I have received a denial for her Lyrica, I have called and left her a message letting her know and to try a discount card to see if this would help pay for the cost.   Lupus    RA (rheumatoid arthritis)    SVT (supraventricular tachycardia)  2007

## 2023-05-02 ENCOUNTER — APPOINTMENT (OUTPATIENT)
Dept: PLASTIC SURGERY | Facility: CLINIC | Age: 53
End: 2023-05-02
Payer: COMMERCIAL

## 2023-05-02 VITALS — HEIGHT: 63 IN | WEIGHT: 176 LBS | TEMPERATURE: 98.8 F | BODY MASS INDEX: 31.18 KG/M2

## 2023-05-02 PROCEDURE — 99203 OFFICE O/P NEW LOW 30 MIN: CPT

## 2023-05-17 ENCOUNTER — APPOINTMENT (OUTPATIENT)
Dept: PLASTIC SURGERY | Facility: CLINIC | Age: 53
End: 2023-05-17
Payer: COMMERCIAL

## 2023-05-17 PROCEDURE — 99211 OFF/OP EST MAY X REQ PHY/QHP: CPT | Mod: NC

## 2023-05-17 NOTE — HISTORY OF PRESENT ILLNESS
[FreeTextEntry1] : 52-year-old female with torn earlobe on the left side.  Patient reports that earring got caught and split entire ear approximately 2 weeks ago.  There was minimal bleeding at the time.  There is no infection.\par No right ear also has a partially torn  lobe that has been stretching out over several years.  There is a history of keloid scarring\par Past medical history is significant for lupus\par Denies previous surgeries

## 2023-05-23 ENCOUNTER — APPOINTMENT (OUTPATIENT)
Dept: PLASTIC SURGERY | Facility: CLINIC | Age: 53
End: 2023-05-23
Payer: COMMERCIAL

## 2023-05-23 PROCEDURE — 14060 TIS TRNFR E/N/E/L 10 SQ CM/<: CPT

## 2023-05-24 NOTE — PROCEDURE
[FreeTextEntry6] : preop dx: left ear deformity\par postop : same\par procedure: left ear reconstruction with local flap, 1cm x 8mm\par no specimens\par no abx\par \par IC obtained.  lido w/ epi injected.  15 blade used to de-epithelialize wound edges.  chondrocutaneous lateral portion of pinna advanced into defect [id number].  deep tissues closed with 5-0 monocryl.  skin reapproximated with 6-0 prolene horizontal mattresses.  baci placed.\par

## 2023-06-01 ENCOUNTER — APPOINTMENT (OUTPATIENT)
Dept: PLASTIC SURGERY | Facility: CLINIC | Age: 53
End: 2023-06-01
Payer: COMMERCIAL

## 2023-06-01 DIAGNOSIS — H61.119 ACQUIRED DEFORMITY OF PINNA, UNSPECIFIED EAR: ICD-10-CM

## 2023-06-01 PROCEDURE — 99024 POSTOP FOLLOW-UP VISIT: CPT

## 2023-06-01 NOTE — HISTORY OF PRESENT ILLNESS
[FreeTextEntry1] : Dop; 5/23/23 \par S/P:  left earlobe repair, completely torn. \par  No excessive bleeding. No fevers. No odor. No purulent discharge. No excessive pain.\par h/o keloid
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
## 2023-10-02 NOTE — ASU PATIENT PROFILE, ADULT - PATIENT'S HEIGHT AND WEIGHT RECORDED IN THE VITAL SIGNS FLOWSHEET
- Encouraged healthy lifestyle, including adequate exercise and high fiber, low fat and low carb diet.   - recheck around Oct. 2023   yes

## 2024-06-12 NOTE — ED ADULT NURSE NOTE - CHIEF COMPLAINT
Patients Daughter calling on behalf of patient regarding message sent about patients finger. PCP asked if patient has ever tried prednisone before, daughter states she has not and would be willing to try it, can send script to [facility] in [place]. Daughter also requesting clarification regarding the Clochincine. States she thought PCP told her to only take it for a week, but script is written to be taken daily with a 30 days supply, so patient has been taking it daily for a while now. Nothing in OV notes from 5/3/24 to clarify this. Please call daughter back and leave detailed message if needed, does not check patients Mychart frequently.   
The patient is a 48y Female complaining of facial swelling.

## 2024-06-20 NOTE — PACU DISCHARGE NOTE - THE ANESTHESIA ORDERS USED IN THE PACU ORDER SET WILL BE DISCONTINUED UPON TRANSFER OF THIS PATIENT
Problem: Physical Therapy - Adult  Goal: By Discharge: Performs mobility at highest level of function for planned discharge setting.  See evaluation for individualized goals.  Description: FUNCTIONAL STATUS PRIOR TO ADMISSION: Mod I with use of rolling walker. Denies history of falls. Still driving.     HOME SUPPORT PRIOR TO ADMISSION: The patient lived alone however states daughter and grandson will be assisting at discharge.    Physical Therapy Goals  Initiated 6/20/2024  1.  Patient will move from supine to sit and sit to supine, scoot up and down, and roll side to side in bed with modified independence within 4 day(s).    2.  Patient will perform sit to stand with modified independence within 4 day(s).  3.  Patient will transfer from bed to chair and chair to bed with modified independence using the least restrictive device within 4 day(s).  4.  Patient will ambulate with modified independence for 300 feet with the least restrictive device within 4 day(s).   5.  Patient will ascend/descend 4 stairs with 1 handrail(s) with modified independence within 4 day(s).   6/20/2024 1507 by Gretchen Neely, PT  Outcome: Progressing     
  Problem: Physical Therapy - Adult  Goal: By Discharge: Performs mobility at highest level of function for planned discharge setting.  See evaluation for individualized goals.  Description: FUNCTIONAL STATUS PRIOR TO ADMISSION: Mod I with use of rolling walker. Denies history of falls. Still driving.     HOME SUPPORT PRIOR TO ADMISSION: The patient lived alone however states daughter and grandson will be assisting at discharge.    Physical Therapy Goals  Initiated 6/20/2024  1.  Patient will move from supine to sit and sit to supine, scoot up and down, and roll side to side in bed with modified independence within 4 day(s).    2.  Patient will perform sit to stand with modified independence within 4 day(s).  3.  Patient will transfer from bed to chair and chair to bed with modified independence using the least restrictive device within 4 day(s).  4.  Patient will ambulate with modified independence for 300 feet with the least restrictive device within 4 day(s).   5.  Patient will ascend/descend 4 stairs with 1 handrail(s) with modified independence within 4 day(s).   Outcome: Progressing   PHYSICAL THERAPY EVALUATION    Patient: Bita Moreno (75 y.o. female)  Date: 6/20/2024  Primary Diagnosis: Primary osteoarthritis of right hip [M16.11]  S/P total right hip arthroplasty [Z96.641]  Procedure(s) (LRB):  RIGHT TOTAL HIP ARTHROPLASTY ANTERIOR APPROACH (Right) Day of Surgery   Precautions:                        ASSESSMENT :   DEFICITS/IMPAIRMENTS:   The patient is limited by increased pain levels, altered gait pattern, impaired activity tolerance, and overall impaired functional mobility on POD 0 following R DONNELL - anterior approach. Pt tolerated therapy session well, mobilizing with RW support and SB/CGAx1 throughout. Gait speed decreased to non functional pace with antalgic gait pattern however gait steady overall. No LOB/balance deficits noted. Gait remained steady as pt ascended/descended 4 steps w/ R handrail 
Statement Selected